# Patient Record
Sex: FEMALE | Race: WHITE | NOT HISPANIC OR LATINO | Employment: OTHER | URBAN - METROPOLITAN AREA
[De-identification: names, ages, dates, MRNs, and addresses within clinical notes are randomized per-mention and may not be internally consistent; named-entity substitution may affect disease eponyms.]

---

## 2018-11-27 NOTE — PATIENT DISCUSSION
01/25/2019 update.  Patient wants rx for 1 day mf Cls with OS -8.00/+1.75 OS.    Patient informed that rx may change due to cataract.  Patient instructed not to open or write on any unopened cl boxes.

## 2018-11-27 NOTE — PATIENT DISCUSSION
01/09/2019 update:  patient wants rx for 1 day mf cls.  see above.  Optical to tell patient that rx may change due to cataract.

## 2018-12-10 NOTE — PATIENT DISCUSSION
The patient reported trying monovision with contact lenses in the past and did not like monovision, the patient wears bifocal soft contact lenses.

## 2019-09-30 NOTE — PATIENT DISCUSSION
The patient expressed a desire to see through the full range of vision from distance, to middle, to near without glasses. The limitations of advanced lens technology were reviewed and the recommendation was made for the PanOptix IOL OU. The patient understands there is no guarantee of glasses free vision and the more complete range of vision from the PanOptix lens comes with a trade-off. Side effects include halos around point sources of light at night, reduced contrast sensitivity, and failure to adapt in 1 in 500 cases resulting in the need for exchange of the lens. Enhancement, including Lasik, may be necessary to achieve the full uncorrected vision potential. The patient elects PanOptix OS, goal of emmetropia.

## 2019-09-30 NOTE — PATIENT DISCUSSION
Discussed possibility of enhancement. Per Dr Stefanie Monge was normal.    Will address the HA/sinus'

## 2019-10-07 NOTE — PATIENT DISCUSSION
Patient advised he is measuring with a residual glasses prescription today. Will re-check MR next week to determine stability. If still measuring with the glasses prescription, consider an IOL exchange for power. At next appointment: AR, , Trial Frame OS for emmetropia. Patient will tentatively be scheduled for the IOL exchange for power.

## 2019-10-14 NOTE — PATIENT DISCUSSION
Patient advised that he is measuring a little bit farsighted. Patient did appreciate the trial frame for Emmetropia. Patient given the option to do an IOL exchange for power or Lasik enhancement.  As of right now, the patient desires to have Lasik OS for emmetropia. Patient understands we do have to wait a minimum of 8 weeks to confirm stability prior to Lasik. Will re-evaluate stability in 5-6 weeks.

## 2020-02-18 NOTE — PATIENT DISCUSSION
Patient would like to reassess OD for TMF with DWS prior to [facility] in [place].  Patient states he is content with vision in OS.

## 2020-03-02 NOTE — PATIENT DISCUSSION
Cataract surgery has been performed in the first eye and activities of daily living are still impaired. The patient would like to proceed with cataract surgery in the second eye as scheduled. The patient elects Panoptix OD, goal of emmetropia.

## 2020-03-02 NOTE — PATIENT DISCUSSION
Patient states that the trial frame OS did make the vision sharper. Gave patient a contact lens trial to make sure he likes the vision OS with contact. Patient advised that he is adapting to the lens and and we will make a adjustment OD so that it will reduce his chances of needing enhancement OS. Patient advised that Panoptix OD was recommended.

## 2021-01-20 ENCOUNTER — NEW PATIENT COMPREHENSIVE (OUTPATIENT)
Dept: URBAN - METROPOLITAN AREA CLINIC 38 | Facility: CLINIC | Age: 26
End: 2021-01-20

## 2021-01-20 DIAGNOSIS — H52.203: ICD-10-CM

## 2021-01-20 DIAGNOSIS — H52.03: ICD-10-CM

## 2021-01-20 PROCEDURE — 92004 COMPRE OPH EXAM NEW PT 1/>: CPT

## 2021-01-20 PROCEDURE — 92015 DETERMINE REFRACTIVE STATE: CPT

## 2021-01-20 ASSESSMENT — VISUAL ACUITY
OS_SC: 20/25-3
OD_SC: 20/25-3
OD_SC: J2
OS_SC: J2

## 2021-01-20 ASSESSMENT — KERATOMETRY
OS_AXISANGLE_DEGREES: 40
OS_AXISANGLE2_DEGREES: 130
OS_K1POWER_DIOPTERS: 44.25
OD_K2POWER_DIOPTERS: 45.00
OD_K1POWER_DIOPTERS: 44.50
OD_AXISANGLE2_DEGREES: 85
OS_K2POWER_DIOPTERS: 45.00
OD_AXISANGLE_DEGREES: 175

## 2021-01-20 ASSESSMENT — TONOMETRY
OS_IOP_MMHG: 18
OD_IOP_MMHG: 15

## 2021-04-30 NOTE — PATIENT DISCUSSION
The patient reports he has forgotten to continue the Alphagan P drops in his left eye bid the past 9 months.  Advised pt to resume the Alphagan P OS bid and follow with Dr. Paula Grewal for HVF.

## 2021-04-30 NOTE — PATIENT DISCUSSION
The patient expressed a desire to see through the full range of vision from distance, to middle, to near without glasses. The limitations of advanced lens technology were reviewed and the recommendation was made for the PanOptix IOL OU. The patient understands there is no guarantee of glasses free vision and the more complete range of vision from the PanOptix lens comes with a trade-off. Side effects include halos around point sources of light at night, reduced contrast sensitivity, and failure to adapt in 1 in 500 cases resulting in the need for exchange of the lens. Enhancement, including Lasik, may be necessary to achieve the full uncorrected vision potential.  The patient was advised of probable need to special order the power of his PanOptix IOL for OD. The patient elects PanOptix OD, goal of emmetropia.

## 2021-05-18 NOTE — PATIENT DISCUSSION
The patient reports he has forgotten to continue the Alphagan P drops in his left eye bid the past 9 months.  Advised pt to resume the Alphagan P OS bid and follow with Dr. Jamila Barraza for HVF.

## 2021-05-18 NOTE — PATIENT DISCUSSION
The patient reports he has forgotten to continue the Alphagan P drops in his left eye bid the past 9 months.  Advised pt to resume the Alphagan P OS bid and follow with Dr. Ernestina Wilson for HVF.

## 2021-05-19 NOTE — PATIENT DISCUSSION
Pt has been well educated. Pt will see Dr. Gaby Mcneill to suture monofocal lens. Recommends monofocal lens for distance due to four point fixation to prevent tilt.

## 2021-05-19 NOTE — PATIENT DISCUSSION
The patient reports he has forgotten to continue the Alphagan P drops in his left eye bid the past 9 months.  Advised pt to resume the Alphagan P OS bid and follow with Dr. Valerie Hernandez for HVF.

## 2021-12-06 NOTE — PATIENT DISCUSSION
Discussed increased risk of floaters, myopic degeneration and retinal detachment associated with high myopia. Gen: Patient is anxious appearing, AAOx3, able to ambulate without assistance  HEENT: NCAT, normal conjunctiva, tongue midline, oral mucosa moist  Lung: CTAB, no respiratory distress, no wheezes/rhonchi/rales B/L, speaking in full sentences  CV: RRR, no murmurs, rubs or gallops, distal pulses 2+ b/l  Abd: soft, NT, ND, no guarding, no rigidity, no rebound tenderness  MSK: no visible deformities, ROM normal in UE/LE  Neuro: No focal sensory or motor deficits  Skin: Warm, well perfused, no leg swelling  Psych: normal affect, calm

## 2022-11-11 NOTE — PATIENT DISCUSSION
The patient reports he has forgotten to continue the Alphagan P drops in his left eye bid the past 9 months.  Advised pt to resume the Alphagan P OS bid and follow with Dr. Melina Gutierrez for HVF.

## 2022-12-22 NOTE — PATIENT DISCUSSION
The patient reports he has forgotten to continue the Alphagan P drops in his left eye bid the past 9 months.  Advised pt to resume the Alphagan P OS bid and follow with Dr. Nila Paniagua for HVF.